# Patient Record
Sex: FEMALE | Race: WHITE | NOT HISPANIC OR LATINO | Employment: UNEMPLOYED | URBAN - NONMETROPOLITAN AREA
[De-identification: names, ages, dates, MRNs, and addresses within clinical notes are randomized per-mention and may not be internally consistent; named-entity substitution may affect disease eponyms.]

---

## 2017-09-15 ENCOUNTER — HOSPITAL ENCOUNTER (EMERGENCY)
Facility: HOSPITAL | Age: 42
Discharge: HOME | End: 2017-09-15
Attending: FAMILY MEDICINE | Admitting: FAMILY MEDICINE

## 2017-09-15 VITALS
SYSTOLIC BLOOD PRESSURE: 137 MMHG | BODY MASS INDEX: 32.44 KG/M2 | TEMPERATURE: 97.2 F | HEIGHT: 64 IN | OXYGEN SATURATION: 100 % | RESPIRATION RATE: 16 BRPM | DIASTOLIC BLOOD PRESSURE: 76 MMHG | WEIGHT: 190 LBS | HEART RATE: 93 BPM

## 2017-09-15 DIAGNOSIS — F15.10 METHAMPHETAMINE ABUSE (HCC): Primary | ICD-10-CM

## 2017-09-15 DIAGNOSIS — A59.9 TRICHOMONAS VAGINALIS INFECTION: ICD-10-CM

## 2017-09-15 DIAGNOSIS — N39.0 UTI (URINARY TRACT INFECTION), UNCOMPLICATED: ICD-10-CM

## 2017-09-15 LAB
6-ACETYL MORPHINE: NEGATIVE
ALBUMIN SERPL-MCNC: 4.4 G/DL (ref 3.5–5)
ALBUMIN/GLOB SERPL: 1.3 G/DL (ref 1.5–2.5)
ALP SERPL-CCNC: 106 U/L (ref 35–104)
ALT SERPL W P-5'-P-CCNC: 18 U/L (ref 10–36)
AMPHET+METHAMPHET UR QL: POSITIVE
ANION GAP SERPL CALCULATED.3IONS-SCNC: 6.8 MMOL/L (ref 3.6–11.2)
AST SERPL-CCNC: 20 U/L (ref 10–30)
B-HCG UR QL: NEGATIVE
BACTERIA UR QL AUTO: ABNORMAL /HPF
BARBITURATES UR QL SCN: NEGATIVE
BASOPHILS # BLD AUTO: 0.04 10*3/MM3 (ref 0–0.3)
BASOPHILS NFR BLD AUTO: 0.3 % (ref 0–2)
BENZODIAZ UR QL SCN: NEGATIVE
BILIRUB SERPL-MCNC: 0.4 MG/DL (ref 0.2–1.8)
BILIRUB UR QL STRIP: NEGATIVE
BUN BLD-MCNC: 15 MG/DL (ref 7–21)
BUN/CREAT SERPL: 18.8 (ref 7–25)
BUPRENORPHINE SERPL-MCNC: POSITIVE NG/ML
CALCIUM SPEC-SCNC: 10 MG/DL (ref 7.7–10)
CANNABINOIDS SERPL QL: NEGATIVE
CHLORIDE SERPL-SCNC: 114 MMOL/L (ref 99–112)
CLARITY UR: ABNORMAL
CO2 SERPL-SCNC: 22.2 MMOL/L (ref 24.3–31.9)
COCAINE UR QL: NEGATIVE
COLOR UR: YELLOW
CREAT BLD-MCNC: 0.8 MG/DL (ref 0.43–1.29)
DEPRECATED RDW RBC AUTO: 46.2 FL (ref 37–54)
EOSINOPHIL # BLD AUTO: 0.2 10*3/MM3 (ref 0–0.7)
EOSINOPHIL NFR BLD AUTO: 1.7 % (ref 0–5)
ERYTHROCYTE [DISTWIDTH] IN BLOOD BY AUTOMATED COUNT: 15.5 % (ref 11.5–14.5)
ETHANOL BLD-MCNC: <10 MG/DL
ETHANOL UR QL: <0.01 %
GFR SERPL CREATININE-BSD FRML MDRD: 79 ML/MIN/1.73
GLOBULIN UR ELPH-MCNC: 3.4 GM/DL
GLUCOSE BLD-MCNC: 123 MG/DL (ref 70–110)
GLUCOSE UR STRIP-MCNC: NEGATIVE MG/DL
HCT VFR BLD AUTO: 39.2 % (ref 37–47)
HGB BLD-MCNC: 12.5 G/DL (ref 12–16)
HGB UR QL STRIP.AUTO: ABNORMAL
HYALINE CASTS UR QL AUTO: ABNORMAL /LPF
IMM GRANULOCYTES # BLD: 0.04 10*3/MM3 (ref 0–0.03)
IMM GRANULOCYTES NFR BLD: 0.3 % (ref 0–0.5)
KETONES UR QL STRIP: ABNORMAL
LEUKOCYTE ESTERASE UR QL STRIP.AUTO: ABNORMAL
LYMPHOCYTES # BLD AUTO: 2.42 10*3/MM3 (ref 1–3)
LYMPHOCYTES NFR BLD AUTO: 20.1 % (ref 21–51)
MCH RBC QN AUTO: 26.3 PG (ref 27–33)
MCHC RBC AUTO-ENTMCNC: 31.9 G/DL (ref 33–37)
MCV RBC AUTO: 82.5 FL (ref 80–94)
METHADONE UR QL SCN: NEGATIVE
MONOCYTES # BLD AUTO: 0.81 10*3/MM3 (ref 0.1–0.9)
MONOCYTES NFR BLD AUTO: 6.7 % (ref 0–10)
NEUTROPHILS # BLD AUTO: 8.55 10*3/MM3 (ref 1.4–6.5)
NEUTROPHILS NFR BLD AUTO: 70.9 % (ref 30–70)
NITRITE UR QL STRIP: NEGATIVE
OPIATES UR QL: NEGATIVE
OSMOLALITY SERPL CALC.SUM OF ELEC: 287.2 MOSM/KG (ref 273–305)
OXYCODONE UR QL SCN: NEGATIVE
PCP UR QL SCN: NEGATIVE
PH UR STRIP.AUTO: <=5 [PH] (ref 5–8)
PLATELET # BLD AUTO: 404 10*3/MM3 (ref 130–400)
PMV BLD AUTO: 9.6 FL (ref 6–10)
POTASSIUM BLD-SCNC: 4.1 MMOL/L (ref 3.5–5.3)
PROT SERPL-MCNC: 7.8 G/DL (ref 6–8)
PROT UR QL STRIP: ABNORMAL
RBC # BLD AUTO: 4.75 10*6/MM3 (ref 4.2–5.4)
RBC # UR: ABNORMAL /HPF
REF LAB TEST METHOD: ABNORMAL
SODIUM BLD-SCNC: 143 MMOL/L (ref 135–153)
SP GR UR STRIP: >=1.03 (ref 1–1.03)
SQUAMOUS #/AREA URNS HPF: ABNORMAL /HPF
TRICHOMONAS #/AREA URNS HPF: ABNORMAL /HPF
UROBILINOGEN UR QL STRIP: ABNORMAL
WBC NRBC COR # BLD: 12.06 10*3/MM3 (ref 4.5–12.5)
WBC UR QL AUTO: ABNORMAL /HPF

## 2017-09-15 PROCEDURE — 80307 DRUG TEST PRSMV CHEM ANLYZR: CPT | Performed by: PHYSICIAN ASSISTANT

## 2017-09-15 PROCEDURE — 96372 THER/PROPH/DIAG INJ SC/IM: CPT

## 2017-09-15 PROCEDURE — 25010000002 CEFTRIAXONE PER 250 MG: Performed by: PHYSICIAN ASSISTANT

## 2017-09-15 PROCEDURE — 80053 COMPREHEN METABOLIC PANEL: CPT | Performed by: PHYSICIAN ASSISTANT

## 2017-09-15 PROCEDURE — 85025 COMPLETE CBC W/AUTO DIFF WBC: CPT | Performed by: PHYSICIAN ASSISTANT

## 2017-09-15 PROCEDURE — 81001 URINALYSIS AUTO W/SCOPE: CPT | Performed by: PHYSICIAN ASSISTANT

## 2017-09-15 PROCEDURE — 99284 EMERGENCY DEPT VISIT MOD MDM: CPT

## 2017-09-15 PROCEDURE — 87086 URINE CULTURE/COLONY COUNT: CPT | Performed by: PHYSICIAN ASSISTANT

## 2017-09-15 PROCEDURE — 81025 URINE PREGNANCY TEST: CPT | Performed by: PHYSICIAN ASSISTANT

## 2017-09-15 RX ORDER — LIDOCAINE HYDROCHLORIDE 10 MG/ML
2.1 INJECTION, SOLUTION EPIDURAL; INFILTRATION; INTRACAUDAL; PERINEURAL ONCE
Status: COMPLETED | OUTPATIENT
Start: 2017-09-15 | End: 2017-09-15

## 2017-09-15 RX ORDER — NITROFURANTOIN 25; 75 MG/1; MG/1
100 CAPSULE ORAL 2 TIMES DAILY
Qty: 10 CAPSULE | Refills: 0 | Status: SHIPPED | OUTPATIENT
Start: 2017-09-15

## 2017-09-15 RX ORDER — METRONIDAZOLE 250 MG/1
2000 TABLET ORAL ONCE
Status: COMPLETED | OUTPATIENT
Start: 2017-09-15 | End: 2017-09-15

## 2017-09-15 RX ORDER — CEFTRIAXONE 1 G/1
1000 INJECTION, POWDER, FOR SOLUTION INTRAMUSCULAR; INTRAVENOUS ONCE
Status: COMPLETED | OUTPATIENT
Start: 2017-09-15 | End: 2017-09-15

## 2017-09-15 RX ADMIN — METRONIDAZOLE 2000 MG: 250 TABLET ORAL at 06:58

## 2017-09-15 RX ADMIN — LIDOCAINE HYDROCHLORIDE 2.1 ML: 10 INJECTION, SOLUTION EPIDURAL; INFILTRATION; INTRACAUDAL; PERINEURAL at 06:58

## 2017-09-15 RX ADMIN — CEFTRIAXONE 1000 MG: 1 INJECTION, POWDER, FOR SOLUTION INTRAMUSCULAR; INTRAVENOUS at 06:57

## 2017-09-15 NOTE — NURSING NOTE
Reviewed information with Dr. Jerome. Instructed to provide pt a detox packet and dc home. Rbvox2.  and refer to domestic placement. Informed him pt not interested at this time. Ed provider and pt notified of plan of care.

## 2017-09-15 NOTE — ED PROVIDER NOTES
Subjective   HPI Comments: 41 year old female presents to the ED for psych evaluation due to meth use. Patient was brought here tonight by police and she reported to the station because she felt unsafe. States she felt she was going to go use meth again. Patient states that she has used methamphetamines since 2009 and became clean for a year (2015) and starting occasionally using again in March 2016. She states that she and her  have marital problems and when they have issues she will usually leave the house and go use meth. Also reports to occasional pain pill use, as well. She last used meth 4 days ago for the first time in 22 days and at that time reports to injecting 10cc. She denies suicidal or homicidal ideations. Last month patient states she overdosed on muscle relaxer's and was in [facility].     Patient is a 41 y.o. female presenting with mental health disorder.   History provided by:  Patient   used: No    Mental Health Problem   Presenting symptoms: no agitation, no depression, no disorganized thought process, no homicidal ideas, no suicidal thoughts and no suicide attempt    Degree of incapacity (severity):  Mild  Timing:  Intermittent  Progression:  Waxing and waning  Chronicity:  Chronic  Context: drug abuse and stressful life event    Context: not alcohol use and not recent medication change    Context comment:  Marital problems  Treatment compliance:  Untreated  Ineffective treatments:  None tried  Associated symptoms: no abdominal pain, no anxiety, no chest pain, no feelings of worthlessness, no insomnia and no irritability    Risk factors: family violence    Risk factors: no hx of mental illness        Review of Systems   Constitutional: Negative.  Negative for fever and irritability.   HENT: Negative.    Respiratory: Negative.    Cardiovascular: Negative.  Negative for chest pain.   Gastrointestinal: Negative.  Negative for abdominal pain.   Endocrine:  Negative.    Genitourinary: Negative.  Negative for dysuria.   Skin: Negative.    Neurological: Negative.    Psychiatric/Behavioral: Negative.  Negative for agitation, homicidal ideas and suicidal ideas. The patient is not nervous/anxious and does not have insomnia.    All other systems reviewed and are negative.      Past Medical History:   Diagnosis Date   • Hypertension    • Substance abuse    • Withdrawal symptoms, drug or narcotic        No Known Allergies    Past Surgical History:   Procedure Laterality Date   • TUBAL FILSHIE CLIPPING LAPAROSCOPIC WITH ENDOMETRIAL ABLATION         Family History   Problem Relation Age of Onset   • No Known Problems Mother    • No Known Problems Father    • No Known Problems Sister    • No Known Problems Brother    • No Known Problems Maternal Grandfather    • No Known Problems Paternal Grandmother    • No Known Problems Paternal Grandfather    • No Known Problems Cousin        Social History     Social History   • Marital status:      Spouse name: N/A   • Number of children: N/A   • Years of education: N/A     Social History Main Topics   • Smoking status: Current Every Day Smoker     Packs/day: 1.00     Years: 20.00     Types: Cigarettes   • Smokeless tobacco: None   • Alcohol use No      Comment: denies   • Drug use: 7.00 per week     Special: Methamphetamines      Comment: occasional hydrodocone.    • Sexual activity: Yes     Birth control/ protection: None     Other Topics Concern   • None     Social History Narrative           Objective   Physical Exam   Constitutional: She is oriented to person, place, and time. She appears well-developed and well-nourished. No distress.   HENT:   Head: Normocephalic and atraumatic.   Right Ear: External ear normal.   Left Ear: External ear normal.   Nose: Nose normal.   Eyes: Conjunctivae and EOM are normal. Pupils are equal, round, and reactive to light.   Neck: Normal range of motion. Neck supple. No JVD present. No tracheal  deviation present.   Cardiovascular: Normal rate, regular rhythm and normal heart sounds.    No murmur heard.  Pulmonary/Chest: Effort normal and breath sounds normal. No respiratory distress. She has no wheezes.   Abdominal: Soft. Bowel sounds are normal. There is no tenderness.   Musculoskeletal: Normal range of motion. She exhibits no edema or deformity.   Neurological: She is alert and oriented to person, place, and time. No cranial nerve deficit.   Skin: Skin is warm and dry. No rash noted. She is not diaphoretic. No erythema. No pallor.   Psychiatric: She has a normal mood and affect. Her behavior is normal. Thought content normal.   Nursing note and vitals reviewed.      Procedures         ED Course  ED Course   Comment By Time   Discussed UA findings with patient, informed will treat. Devin Rubio PA-C 09/15 [id number]                  [facility]    Final diagnoses:   Methamphetamine abuse   UTI (urinary tract infection), uncomplicated   Trichomonas vaginalis infection            Devin Rubio PA-C  09/15/17 0694

## 2017-09-15 NOTE — NURSING NOTE
Intake assessment completed. Patient reports that she is here today because she is a meth user and had been clean for about 22 days and relapsed a few days and used one time. She reports that she was at home last night and got into an argument with her  and was upset and left the house. She started to go to the dealer and get some meth but decided to [place] into [place] police [place] and asked them for help so that she did not use again and they brought her to the emergency room for help. She says that she occasionally will use a pain pill for pain but does not feel they are the problem and uses very little meth at this time and wants to get into a rehab program so that she can get back on track. She denies any withdrawal or cravings at this time. Anxiety and depression both rated a 0/10. She denies any si or hi and says that things are pretty good at home except for the occasional argument. She denies any abuse and says that her  treats her good for the most part. Emotional support provided to patient.    Yes, would be happy to fill out FMLA for missed work during multiple appointments, please have patient drop off

## 2017-09-17 LAB — BACTERIA SPEC AEROBE CULT: NORMAL

## 2019-08-21 ENCOUNTER — TELEPHONE (OUTPATIENT)
Dept: GASTROENTEROLOGY | Facility: CLINIC | Age: 44
End: 2019-08-21

## 2019-08-21 ENCOUNTER — OFFICE VISIT (OUTPATIENT)
Dept: PHARMACY | Facility: HOSPITAL | Age: 44
End: 2019-08-21

## 2019-08-21 VITALS
HEIGHT: 63 IN | HEART RATE: 67 BPM | DIASTOLIC BLOOD PRESSURE: 72 MMHG | WEIGHT: 233 LBS | SYSTOLIC BLOOD PRESSURE: 118 MMHG | BODY MASS INDEX: 41.29 KG/M2 | OXYGEN SATURATION: 94 %

## 2019-08-21 DIAGNOSIS — F19.91 HISTORY OF DRUG USE: ICD-10-CM

## 2019-08-21 DIAGNOSIS — B18.2 CHRONIC HEPATITIS C WITHOUT HEPATIC COMA (HCC): Primary | ICD-10-CM

## 2019-08-21 LAB
ALBUMIN SERPL-MCNC: 4.3 G/DL (ref 3.5–5.2)
ALBUMIN/GLOB SERPL: 1.1 G/DL
ALP SERPL-CCNC: 94 U/L (ref 39–117)
ALPHA-FETOPROTEIN: 1.64 NG/ML (ref 0–8.3)
ALT SERPL W P-5'-P-CCNC: 13 U/L (ref 1–33)
ANION GAP SERPL CALCULATED.3IONS-SCNC: 6.3 MMOL/L (ref 5–15)
AST SERPL-CCNC: 16 U/L (ref 1–32)
BILIRUB SERPL-MCNC: 0.2 MG/DL (ref 0.2–1.2)
BUN BLD-MCNC: 8 MG/DL (ref 6–20)
BUN/CREAT SERPL: 38.1 (ref 7–25)
CALCIUM SPEC-SCNC: 9.3 MG/DL (ref 8.6–10.5)
CHLORIDE SERPL-SCNC: 105 MMOL/L (ref 98–107)
CO2 SERPL-SCNC: 25.7 MMOL/L (ref 22–29)
CREAT BLD-MCNC: 0.21 MG/DL (ref 0.57–1)
DEPRECATED RDW RBC AUTO: 47.8 FL (ref 37–54)
ERYTHROCYTE [DISTWIDTH] IN BLOOD BY AUTOMATED COUNT: 13.9 % (ref 12.3–15.4)
GFR SERPL CREATININE-BSD FRML MDRD: >150 ML/MIN/1.73
GLOBULIN UR ELPH-MCNC: 3.8 GM/DL
GLUCOSE BLD-MCNC: 106 MG/DL (ref 65–99)
HAV IGM SERPL QL IA: ABNORMAL
HBV CORE IGM SERPL QL IA: ABNORMAL
HBV SURFACE AB SER RIA-ACNC: REACTIVE
HBV SURFACE AG SERPL QL IA: ABNORMAL
HCG SERPL QL: NEGATIVE
HCT VFR BLD AUTO: 42.6 % (ref 34–46.6)
HCV AB SER DONR QL: REACTIVE
HGB BLD-MCNC: 12.8 G/DL (ref 12–15.9)
HIV1+2 AB SER QL: NORMAL
INR PPP: 0.93 (ref 0.9–1.1)
MCH RBC QN AUTO: 28.2 PG (ref 26.6–33)
MCHC RBC AUTO-ENTMCNC: 30 G/DL (ref 31.5–35.7)
MCV RBC AUTO: 93.8 FL (ref 79–97)
PLATELET # BLD AUTO: 325 10*3/MM3 (ref 140–450)
PMV BLD AUTO: 9.9 FL (ref 6–12)
POTASSIUM BLD-SCNC: 4.4 MMOL/L (ref 3.5–5.2)
PROT SERPL-MCNC: 8.1 G/DL (ref 6–8.5)
PROTHROMBIN TIME: 13 SECONDS (ref 11–15.4)
RBC # BLD AUTO: 4.54 10*6/MM3 (ref 3.77–5.28)
SODIUM BLD-SCNC: 137 MMOL/L (ref 136–145)
TSH SERPL DL<=0.05 MIU/L-ACNC: 1.38 MIU/ML (ref 0.27–4.2)
WBC NRBC COR # BLD: 8.64 10*3/MM3 (ref 3.4–10.8)

## 2019-08-21 PROCEDURE — 82105 ALPHA-FETOPROTEIN SERUM: CPT

## 2019-08-21 PROCEDURE — 80074 ACUTE HEPATITIS PANEL: CPT

## 2019-08-21 PROCEDURE — 81596 NFCT DS CHRNC HCV 6 ASSAYS: CPT

## 2019-08-21 PROCEDURE — 36415 COLL VENOUS BLD VENIPUNCTURE: CPT

## 2019-08-21 PROCEDURE — 86704 HEP B CORE ANTIBODY TOTAL: CPT

## 2019-08-21 PROCEDURE — 99243 OFF/OP CNSLTJ NEW/EST LOW 30: CPT | Performed by: PHYSICIAN ASSISTANT

## 2019-08-21 PROCEDURE — 86708 HEPATITIS A ANTIBODY: CPT

## 2019-08-21 PROCEDURE — G0483 DRUG TEST DEF 22+ CLASSES: HCPCS

## 2019-08-21 PROCEDURE — 86706 HEP B SURFACE ANTIBODY: CPT

## 2019-08-21 PROCEDURE — 80307 DRUG TEST PRSMV CHEM ANLYZR: CPT

## 2019-08-21 PROCEDURE — 87522 HEPATITIS C REVRS TRNSCRPJ: CPT

## 2019-08-21 PROCEDURE — 80050 GENERAL HEALTH PANEL: CPT

## 2019-08-21 PROCEDURE — 84703 CHORIONIC GONADOTROPIN ASSAY: CPT

## 2019-08-21 PROCEDURE — G0432 EIA HIV-1/HIV-2 SCREEN: HCPCS

## 2019-08-21 PROCEDURE — 85610 PROTHROMBIN TIME: CPT

## 2019-08-21 RX ORDER — TRAZODONE HYDROCHLORIDE 100 MG/1
100 TABLET ORAL NIGHTLY
COMMUNITY

## 2019-08-21 RX ORDER — LOSARTAN POTASSIUM 50 MG/1
50 TABLET ORAL 2 TIMES DAILY
COMMUNITY

## 2019-08-21 RX ORDER — GABAPENTIN 400 MG/1
400 CAPSULE ORAL 3 TIMES DAILY
COMMUNITY

## 2019-08-21 RX ORDER — FLUOXETINE HYDROCHLORIDE 20 MG/1
20 CAPSULE ORAL DAILY
COMMUNITY

## 2019-08-21 NOTE — PROGRESS NOTES
: 1975    Chief Complaint   Patient presents with   • Hepatitis C       Ewa Man is a 43 y.o. female who presents to the office today as a consultation from ANA CRISTINA Short for evaluation of Hepatitis C.    History of Present Illness:  She has known about having Hepatitis C infection for the past 1 month, thinks she has had it for a while now without knowing. She previously used IV drugs but has been clean for 1 year and 8 months. No previous hep C treatment. No other personal history of liver disease. No family history of liver disease. She has non-professional tattoos. No recent liver imaging. Did have labs recently with her PCP. No current alcohol use and no previous daily use. No current illicit drug use. She is prescribed Neurontin but is trying to stop taking them all together.     Review of Systems   Constitutional: Positive for activity change, fatigue and fever. Negative for unexpected weight change.   HENT: Positive for sore throat and trouble swallowing.    Eyes: Negative.    Respiratory: Negative for cough, chest tightness and shortness of breath.    Cardiovascular: Negative for chest pain and leg swelling.   Gastrointestinal: Negative for abdominal distention, abdominal pain, anal bleeding, blood in stool, constipation, diarrhea, nausea, rectal pain and vomiting.   Endocrine: Negative.    Genitourinary: Negative for difficulty urinating.   Musculoskeletal: Positive for back pain and joint swelling. Negative for neck pain and neck stiffness.   Skin: Positive for wound.   Allergic/Immunologic: Negative for environmental allergies and food allergies.   Neurological: Negative for dizziness, seizures and headaches.   Hematological: Does not bruise/bleed easily.   Psychiatric/Behavioral: Negative for agitation and sleep disturbance. The patient is not nervous/anxious.        Past Medical History:   Diagnosis Date   • Anemia    • Hypertension    • Infectious viral hepatitis    • Substance  abuse (CMS/HCC)    • Withdrawal symptoms, drug or narcotic (CMS/HCC)        Past Surgical History:   Procedure Laterality Date   • TUBAL ABDOMINAL LIGATION     • TUBAL FILSHIE CLIPPING LAPAROSCOPIC WITH ENDOMETRIAL ABLATION         Family History   Problem Relation Age of Onset   • No Known Problems Mother    • No Known Problems Father    • No Known Problems Sister    • No Known Problems Brother    • No Known Problems Maternal Grandfather    • No Known Problems Paternal Grandmother    • No Known Problems Paternal Grandfather    • No Known Problems Cousin        Social History     Socioeconomic History   • Marital status:      Spouse name: Not on file   • Number of children: Not on file   • Years of education: Not on file   • Highest education level: Not on file   Tobacco Use   • Smoking status: Current Every Day Smoker     Packs/day: 1.00     Years: 20.00     Pack years: 20.00     Types: Cigarettes   • Smokeless tobacco: Never Used   Substance and Sexual Activity   • Alcohol use: No     Comment: denies   • Drug use: Yes     Frequency: 7.0 times per week     Types: Methamphetamines     Comment: occasional hydrodocone.    • Sexual activity: Yes     Birth control/protection: None       Current Outpatient Medications:   •  albuterol (PROVENTIL HFA;VENTOLIN HFA) 108 (90 BASE) MCG/ACT inhaler, Inhale 2 puffs every 6 (six) hours as needed for wheezing., Disp: , Rfl:   •  FLUoxetine (PROzac) 20 MG capsule, Take 20 mg by mouth Daily., Disp: , Rfl:   •  gabapentin (NEURONTIN) 400 MG capsule, Take 400 mg by mouth 3 (Three) Times a Day., Disp: , Rfl:   •  losartan (COZAAR) 50 MG tablet, Take 50 mg by mouth 2 (Two) Times a Day., Disp: , Rfl:   •  QUEtiapine Fumarate (SEROQUEL PO), Take  by mouth., Disp: , Rfl:   •  traZODone (DESYREL) 100 MG tablet, Take 100 mg by mouth Every Night., Disp: , Rfl:   •  lisinopril (PRINIVIL,ZESTRIL) 10 MG tablet, Take 10 mg by mouth daily., Disp: , Rfl:   •  nitrofurantoin,  "macrocrystal-monohydrate, (MACROBID) 100 MG capsule, Take 1 capsule by mouth 2 (Two) Times a Day., Disp: 10 capsule, Rfl: 0    Allergies:   Patient has no known allergies.    Vitals:  /72   Pulse 67   Ht 160 cm (63\")   Wt 106 kg (233 lb)   SpO2 94%   BMI 41.27 kg/m²     Physical Exam   Constitutional: She is oriented to person, place, and time. She appears well-developed and well-nourished. No distress.   HENT:   Head: Normocephalic and atraumatic.   Nose: Nose normal.   Mouth/Throat: Oropharynx is clear and moist.   Eyes: Conjunctivae are normal. Right eye exhibits no discharge. Left eye exhibits no discharge. No scleral icterus.   Neck: Normal range of motion. No JVD present.   Cardiovascular: Normal rate and regular rhythm. Exam reveals no gallop and no friction rub.   Murmur heard.  Pulmonary/Chest: Effort normal and breath sounds normal. No respiratory distress. She has no wheezes. She has no rales. She exhibits no tenderness.   Abdominal: Soft. Bowel sounds are normal. She exhibits no mass. There is no tenderness.   obese   Musculoskeletal: Normal range of motion. She exhibits no edema or deformity.   Neurological: She is alert and oriented to person, place, and time. Coordination normal.   Skin: Skin is warm and dry. No rash noted. She is not diaphoretic. No erythema.   Psychiatric: She has a normal mood and affect. Her behavior is normal. Judgment and thought content normal.   Vitals reviewed.    Results Review:  No results available.    Assessment:  1. Chronic hepatitis C without hepatic coma (CMS/HCC)    2. History of drug use      Plan:  Orders Placed This Encounter   Procedures   • US Liver   • AFP Tumor Marker   • CBC (No Diff)   • Comprehensive Metabolic Panel   • hCG, Serum, Qualitative   • HCV FibroSURE   • HCV RNA By PCR, Qn Rfx Marlene   • Hepatitis A Antibody, Total   • Hepatitis B Core Antibody, Total   • Hepatitis B Surface Antibody   • Hepatitis Panel, Acute   • HIV-1 / O / 2 Ag / " Antibody 4th Generation   • Protime-INR   • Compliance Drug Analysis, Ur - Urine, Clean Catch   • TSH     More recommendations will be made after these results have been reviewed. She will continue to abstain from alcohol and illegal drugs. She will have US liver to determine if any lesions or other liver diseases present. Immunity to Hep A and B will be determined and vaccinations recommended if needed. If drug and alcohol screen negative, then we will proceed with Hep C therapy and will submit Rx to insurance company for approval. Treatment will depend on fibrosis score and Hep C genotype. When approved, she will  Rx from our pharmacy and have another appointment with me. Hep C viral load lab will be checked 4 weeks after start of therapy, at end of therapy and 3 months s/p therapy to determine response to treatment and cure. She will call with concerns.             Return in about 2 weeks (around 9/4/2019) for discussion of results.      Electronically signed 8/21/2019 3:33 PM  Sofie Walter PA-C, [facility]

## 2019-08-22 LAB
HAV AB SER QL IA: POSITIVE
HBV CORE AB SER DONR QL IA: POSITIVE

## 2019-08-23 LAB
A2 MACROGLOB SERPL-MCNC: 150 MG/DL (ref 110–276)
ALT SERPL W P-5'-P-CCNC: 13 IU/L (ref 0–40)
APO A-I SERPL-MCNC: 155 MG/DL (ref 116–209)
BILIRUB SERPL-MCNC: 0.1 MG/DL (ref 0–1.2)
FIBROSIS SCORING:: ABNORMAL
FIBROSIS STAGE SERPL QL: ABNORMAL
GGT SERPL-CCNC: 10 IU/L (ref 0–60)
HAPTOGLOB SERPL-MCNC: 205 MG/DL (ref 34–200)
HCV AB SER QL: ABNORMAL
HCV GENTYP SERPL NAA+PROBE: NORMAL
HCV RNA SERPL NAA+PROBE-ACNC: NORMAL IU/ML
HCV RNA SERPL NAA+PROBE-LOG IU: NORMAL {LOG_IU}/ML
LABORATORY COMMENT REPORT: ABNORMAL
LIMITATIONS:: ABNORMAL
LIVER FIBR SCORE SERPL CALC.FIBROSURE: 0.01 (ref 0–0.21)
NECROINFLAMM ACTIVITY SCORING:: ABNORMAL
NECROINFLAMMATORY ACT GRADE SERPL QL: ABNORMAL
NECROINFLAMMATORY ACT SCORE SERPL: 0.03 (ref 0–0.17)
REF LAB TEST REF RANGE: NORMAL

## 2019-08-25 LAB — CONV REPORT SUMMARY: NORMAL

## 2019-08-27 ENCOUNTER — APPOINTMENT (OUTPATIENT)
Dept: ULTRASOUND IMAGING | Facility: HOSPITAL | Age: 44
End: 2019-08-27

## 2019-09-04 ENCOUNTER — OFFICE VISIT (OUTPATIENT)
Dept: PHARMACY | Facility: HOSPITAL | Age: 44
End: 2019-09-04

## 2019-09-04 VITALS
BODY MASS INDEX: 41.29 KG/M2 | HEIGHT: 63 IN | WEIGHT: 233 LBS | SYSTOLIC BLOOD PRESSURE: 134 MMHG | HEART RATE: 73 BPM | DIASTOLIC BLOOD PRESSURE: 84 MMHG | OXYGEN SATURATION: 94 %

## 2019-09-04 DIAGNOSIS — Z86.19 HISTORY OF HEPATITIS C: Primary | ICD-10-CM

## 2019-09-04 DIAGNOSIS — Z86.19 HISTORY OF HEPATITIS B: ICD-10-CM

## 2019-09-04 DIAGNOSIS — F19.90 ILLICIT DRUG USE: ICD-10-CM

## 2019-09-04 PROCEDURE — 99213 OFFICE O/P EST LOW 20 MIN: CPT | Performed by: PHYSICIAN ASSISTANT

## 2019-09-04 NOTE — PROGRESS NOTES
: 1975    Chief Complaint   Patient presents with   • Hepatitis C       Ewa Man is a 43 y.o. female who presents to the office today as a follow up appointment regarding Hepatitis C.    History of Present Illness:  She completed labs as ordered last visit for consideration of hepatitis C therapy.  She would like to discuss those results today. She has known about having Hepatitis C infection for the past 2 months, thinks she has had it for a while now without knowing. She previously used IV drugs but has been clean for 1 year and 8 months. No previous hep C treatment. No other personal history of liver disease. No family history of liver disease. She has non-professional tattoos. Did have labs recently with her PCP. No current alcohol use and no previous daily use. She is prescribed Neurontin but is trying to stop taking them all together.     Review of Systems   Constitutional: Negative for activity change, fatigue, fever and unexpected weight change.   HENT: Negative for trouble swallowing.    Eyes: Negative.    Respiratory: Negative for cough, chest tightness and shortness of breath.    Cardiovascular: Negative for chest pain and leg swelling.   Gastrointestinal: Negative for abdominal distention, abdominal pain, anal bleeding, blood in stool, constipation, diarrhea, nausea, rectal pain and vomiting.   Endocrine: Negative.    Genitourinary: Negative for difficulty urinating.   Musculoskeletal: Positive for back pain and joint swelling. Negative for neck pain and neck stiffness.   Skin: Positive for wound.   Allergic/Immunologic: Negative for environmental allergies and food allergies.   Neurological: Negative for dizziness, seizures and headaches.   Hematological: Does not bruise/bleed easily.   Psychiatric/Behavioral: Negative for agitation and sleep disturbance. The patient is not nervous/anxious.        Past Medical History:   Diagnosis Date   • Anemia    • Hypertension    • Infectious viral  hepatitis    • Substance abuse (CMS/HCC)    • Withdrawal symptoms, drug or narcotic (CMS/HCC)        Past Surgical History:   Procedure Laterality Date   • TUBAL ABDOMINAL LIGATION     • TUBAL FILSHIE CLIPPING LAPAROSCOPIC WITH ENDOMETRIAL ABLATION         Family History   Problem Relation Age of Onset   • No Known Problems Mother    • No Known Problems Father    • No Known Problems Sister    • No Known Problems Brother    • No Known Problems Maternal Grandfather    • No Known Problems Paternal Grandmother    • No Known Problems Paternal Grandfather    • No Known Problems Cousin        Social History     Socioeconomic History   • Marital status:      Spouse name: Not on file   • Number of children: Not on file   • Years of education: Not on file   • Highest education level: Not on file   Tobacco Use   • Smoking status: Current Every Day Smoker     Packs/day: 1.00     Years: 20.00     Pack years: 20.00     Types: Cigarettes   • Smokeless tobacco: Never Used   Substance and Sexual Activity   • Alcohol use: No     Comment: denies   • Drug use: Yes     Frequency: 7.0 times per week     Types: Methamphetamines     Comment: occasional hydrodocone.    • Sexual activity: Yes     Birth control/protection: None       Current Outpatient Medications:   •  albuterol (PROVENTIL HFA;VENTOLIN HFA) 108 (90 BASE) MCG/ACT inhaler, Inhale 2 puffs every 6 (six) hours as needed for wheezing., Disp: , Rfl:   •  FLUoxetine (PROzac) 20 MG capsule, Take 20 mg by mouth Daily., Disp: , Rfl:   •  gabapentin (NEURONTIN) 400 MG capsule, Take 400 mg by mouth 3 (Three) Times a Day., Disp: , Rfl:   •  lisinopril (PRINIVIL,ZESTRIL) 10 MG tablet, Take 10 mg by mouth daily., Disp: , Rfl:   •  losartan (COZAAR) 50 MG tablet, Take 50 mg by mouth 2 (Two) Times a Day., Disp: , Rfl:   •  nitrofurantoin, macrocrystal-monohydrate, (MACROBID) 100 MG capsule, Take 1 capsule by mouth 2 (Two) Times a Day., Disp: 10 capsule, Rfl: 0  •  QUEtiapine Fumarate  "(SEROQUEL PO), Take  by mouth., Disp: , Rfl:   •  traZODone (DESYREL) 100 MG tablet, Take 100 mg by mouth Every Night., Disp: , Rfl:     Allergies:   Patient has no known allergies.    Vitals:  /84 (BP Location: Right arm, Patient Position: Sitting, Cuff Size: Adult)   Pulse 73   Ht 160 cm (63\")   Wt 106 kg (233 lb)   SpO2 94%   BMI 41.27 kg/m²     Physical Exam   Constitutional: She is oriented to person, place, and time. She appears well-developed and well-nourished. No distress.   HENT:   Head: Normocephalic and atraumatic.   Nose: Nose normal.   Mouth/Throat: Oropharynx is clear and moist.   Eyes: Conjunctivae are normal. Right eye exhibits no discharge. Left eye exhibits no discharge. No scleral icterus.   Neck: Normal range of motion. No JVD present.   Abdominal:   obese   Musculoskeletal: Normal range of motion. She exhibits no edema or deformity.   Neurological: She is alert and oriented to person, place, and time. Coordination normal.   Skin: No rash noted. She is not diaphoretic. No erythema.   Psychiatric: Her behavior is normal. Judgment and thought content normal.   Anxious affect   Vitals reviewed.    Results Review:  Labs 8/21/2019: AFP normal, CBC normal, AST 16, ALT 13, alk phos and total bilirubin normal, GFR greater than 150, regnancy negative, F0 fibrosis score, A0 inflammation, HCV not detected on hepatitis C viral load, hepatitis A total antibody positive, hepatitis B core total antibody positive, hepatitis B surface antibody positive, acute hepatitis panel shows hepatitis C antibody only, HIV negative, INR normal, urine drug screen is positive for Suboxone which is not prescribed.    She has not yet completed ultrasound liver.    Assessment:  1. History of hepatitis C    2. History of hepatitis B    3. Illicit drug use      Plan:  We discussed all of her results in detail today.  She did fail her drug screen and has been taking some medications that are not prescribed to her.  She " did clear hepatitis C viral infection on her own without treatment.  HCV currently not detected.  Hepatitis C antibody will always be positive because she has history of the infection.  Labs also show history of previous infection with hepatitis B which she has also cleared.  She was counseled on abstinence from any illicit drug use and any alcohol use for her future health.  Liver enzymes normal and ultrasound of the liver is not necessary at this point.        Return if symptoms worsen or fail to improve.      Electronically signed 9/6/2019 11:17 AM  Sofie Walter PA-C, [facility]